# Patient Record
Sex: FEMALE | Race: BLACK OR AFRICAN AMERICAN | NOT HISPANIC OR LATINO | Employment: OTHER | URBAN - METROPOLITAN AREA
[De-identification: names, ages, dates, MRNs, and addresses within clinical notes are randomized per-mention and may not be internally consistent; named-entity substitution may affect disease eponyms.]

---

## 2023-01-01 ENCOUNTER — TELEPHONE (OUTPATIENT)
Dept: PRIMARY CARE | Facility: CLINIC | Age: 88
End: 2023-01-01

## 2023-01-01 ENCOUNTER — LAB (OUTPATIENT)
Dept: LAB | Facility: LAB | Age: 88
End: 2023-01-01
Payer: MEDICARE

## 2023-01-01 ENCOUNTER — OFFICE VISIT (OUTPATIENT)
Dept: PRIMARY CARE | Facility: CLINIC | Age: 88
End: 2023-01-01
Payer: MEDICARE

## 2023-01-01 VITALS
DIASTOLIC BLOOD PRESSURE: 71 MMHG | HEART RATE: 95 BPM | BODY MASS INDEX: 19.75 KG/M2 | WEIGHT: 108 LBS | SYSTOLIC BLOOD PRESSURE: 163 MMHG

## 2023-01-01 DIAGNOSIS — J18.9 PNEUMONIA OF RIGHT LOWER LOBE DUE TO INFECTIOUS ORGANISM: Primary | ICD-10-CM

## 2023-01-01 DIAGNOSIS — N30.01 ACUTE CYSTITIS WITH HEMATURIA: ICD-10-CM

## 2023-01-01 DIAGNOSIS — F03.90 DEMENTIA, SENILE (MULTI): Primary | ICD-10-CM

## 2023-01-01 DIAGNOSIS — R32 URINARY INCONTINENCE, UNSPECIFIED TYPE: ICD-10-CM

## 2023-01-01 DIAGNOSIS — R32 URINARY INCONTINENCE, UNSPECIFIED TYPE: Primary | ICD-10-CM

## 2023-01-01 DIAGNOSIS — E86.0 DEHYDRATION: ICD-10-CM

## 2023-01-01 DIAGNOSIS — R06.89 ABNORMAL BREATH SOUNDS: ICD-10-CM

## 2023-01-01 LAB
APPEARANCE, URINE: ABNORMAL
BACTERIA, URINE: ABNORMAL /HPF
BILIRUBIN, URINE: NEGATIVE
BLOOD, URINE: ABNORMAL
BUDDING YEAST, URINE: PRESENT /HPF
COLOR, URINE: ABNORMAL
GLUCOSE, URINE: NEGATIVE MG/DL
KETONES, URINE: NEGATIVE MG/DL
LEUKOCYTE ESTERASE, URINE: ABNORMAL
MUCUS, URINE: ABNORMAL /LPF
NITRITE, URINE: NEGATIVE
PH, URINE: 5 (ref 5–8)
PROTEIN, URINE: ABNORMAL MG/DL
RBC, URINE: 6 /HPF (ref 0–5)
SPECIFIC GRAVITY, URINE: 1.02 (ref 1–1.03)
SQUAMOUS EPITHELIAL CELLS, URINE: 2 /HPF
UROBILINOGEN, URINE: <2 MG/DL (ref 0–1.9)
WBC, URINE: 14 /HPF (ref 0–5)

## 2023-01-01 PROCEDURE — 1159F MED LIST DOCD IN RCRD: CPT | Performed by: INTERNAL MEDICINE

## 2023-01-01 PROCEDURE — 81001 URINALYSIS AUTO W/SCOPE: CPT

## 2023-01-01 PROCEDURE — 99214 OFFICE O/P EST MOD 30 MIN: CPT | Performed by: INTERNAL MEDICINE

## 2023-01-01 PROCEDURE — 1160F RVW MEDS BY RX/DR IN RCRD: CPT | Performed by: INTERNAL MEDICINE

## 2023-01-01 PROCEDURE — 1036F TOBACCO NON-USER: CPT | Performed by: INTERNAL MEDICINE

## 2023-01-01 RX ORDER — HYDROXYCHLOROQUINE SULFATE 200 MG/1
400 TABLET, FILM COATED ORAL
COMMUNITY
Start: 2015-01-09

## 2023-01-01 RX ORDER — FLUOCINONIDE 0.5 MG/G
CREAM TOPICAL
COMMUNITY
Start: 2018-05-02

## 2023-01-01 RX ORDER — CIPROFLOXACIN 250 MG/1
250 TABLET, FILM COATED ORAL 2 TIMES DAILY
Qty: 10 TABLET | Refills: 0 | Status: SHIPPED | OUTPATIENT
Start: 2023-01-01 | End: 2023-01-01

## 2023-01-01 RX ORDER — KETOCONAZOLE 20 MG/ML
SHAMPOO, SUSPENSION TOPICAL
COMMUNITY
Start: 2020-01-07

## 2023-01-01 RX ORDER — ACETAMINOPHEN 500 MG
TABLET ORAL DAILY
COMMUNITY
Start: 2017-10-20

## 2023-01-01 RX ORDER — LACTOSE-REDUCED FOOD
LIQUID (ML) ORAL
COMMUNITY

## 2023-01-01 RX ORDER — LEVOFLOXACIN 750 MG/1
750 TABLET ORAL DAILY
Qty: 5 TABLET | Refills: 0 | Status: SHIPPED | OUTPATIENT
Start: 2023-01-01 | End: 2023-01-01

## 2023-01-01 ASSESSMENT — ENCOUNTER SYMPTOMS
HEADACHES: 0
FEVER: 0
FREQUENCY: 1
SLEEP DISTURBANCE: 0
CHILLS: 0
PALPITATIONS: 0
DECREASED CONCENTRATION: 1
AGITATION: 0
HYPERACTIVE: 0
UNEXPECTED WEIGHT CHANGE: 0
COUGH: 0
LIGHT-HEADEDNESS: 0
DYSURIA: 0
CONFUSION: 1
ABDOMINAL PAIN: 0
SHORTNESS OF BREATH: 0
NAUSEA: 0
WEAKNESS: 1
DIZZINESS: 0
CHEST TIGHTNESS: 0
WHEEZING: 0
NERVOUS/ANXIOUS: 0

## 2023-03-24 PROBLEM — F03.90 DEMENTIA, SENILE (MULTI): Status: ACTIVE | Noted: 2023-01-01

## 2023-03-24 PROBLEM — R56.9 SEIZURE-LIKE ACTIVITY (MULTI): Status: ACTIVE | Noted: 2023-01-01

## 2023-03-24 PROBLEM — H90.3 ASYMMETRICAL SENSORINEURAL HEARING LOSS: Status: ACTIVE | Noted: 2023-01-01

## 2023-03-24 PROBLEM — E86.0 DEHYDRATION: Status: ACTIVE | Noted: 2023-01-01

## 2023-03-24 PROBLEM — M35.9 CONNECTIVE TISSUE DISEASE, UNDIFFERENTIATED (MULTI): Status: ACTIVE | Noted: 2023-01-01

## 2023-03-24 PROBLEM — E55.9 VITAMIN D DEFICIENCY: Status: ACTIVE | Noted: 2023-01-01

## 2023-03-24 PROBLEM — M85.80 OSTEOPENIA: Status: ACTIVE | Noted: 2023-01-01

## 2023-03-24 PROBLEM — R32 URINARY INCONTINENCE: Status: ACTIVE | Noted: 2023-01-01

## 2023-03-24 PROBLEM — R55 SYNCOPE: Status: ACTIVE | Noted: 2023-01-01

## 2023-03-24 PROBLEM — N18.31 CHRONIC RENAL IMPAIRMENT, STAGE 3A (MULTI): Status: ACTIVE | Noted: 2023-01-01

## 2023-03-29 NOTE — PATIENT INSTRUCTIONS
We will start with a urinalysis as well as a chest x-ray.  Agree with continuing to hydrate with Gatorade or Powerade.  If any acute change in status, please contact us promptly.

## 2023-03-29 NOTE — PROGRESS NOTES
"Мария Frias comes in today for confusion and incontinence.      Ms. Frias comes in today accompanied by 3 of her family members.  There is concern of urinary incontinence, something she has battled for several years with her advancing dementia, but family states that this seems to be slightly worse and with a stronger odor.  They also note increased confusion, again consistent with her dementia but seemingly worse over the past week.  She has not had any fevers.  She herself states that she feels perfectly well with no concerns, but again with a significant cognitive decline.    She was reportedly taken to emergency room about 1 month ago with lower extremity swelling.  This is more prominent in the left lower extremity but only after being up on her feet and seated all day.  This goes down through the night.  Reportedly no further evaluation was done and she was told that her symptoms are not concerning, unclear whether a Doppler was performed.  She denies any pain associated with this.  There has been no change in her breathing.  Family is quite philippe with her while in the office, she seems quite suppressed, not argumentative, and quite polite and quiet.  She has not had any further true syncopal spells, but the daughters state they give her straight pink sea salt whenever she starts to \"go out.\"        Review of Systems   Constitutional:  Negative for chills, fever and unexpected weight change.   Respiratory:  Negative for cough, chest tightness, shortness of breath and wheezing.    Cardiovascular:  Positive for leg swelling. Negative for chest pain and palpitations.   Gastrointestinal:  Negative for abdominal pain and nausea.   Genitourinary:  Positive for frequency. Negative for dysuria and urgency.   Neurological:  Positive for weakness. Negative for dizziness, syncope, light-headedness and headaches.   Psychiatric/Behavioral:  Positive for confusion and decreased concentration. Negative for agitation and " sleep disturbance. The patient is not nervous/anxious and is not hyperactive.        Objective   Physical Exam  Constitutional:       Appearance: Normal appearance.   Cardiovascular:      Rate and Rhythm: Normal rate and regular rhythm.   Pulmonary:      Effort: Pulmonary effort is normal. No respiratory distress.      Breath sounds: Examination of the right-upper field reveals rhonchi. Examination of the right-middle field reveals rhonchi. Examination of the right-lower field reveals rhonchi. Rhonchi present. No decreased breath sounds or wheezing.   Neurological:      Mental Status: She is alert.         Assessment/Plan   Urinary incontinence: Reasonable to check urinalysis.  Unclear whether she will be able to give specimen, she will need to use assistance with at least a hat.  Hopefully will not need catheterized specimen, would have to go through [facility] or ER for this.  Abnormal breath sounds: This is different for her.  Would like to proceed with chest x-ray for further evaluation.  Family voices agreement.  Senile dementia: Have spent lengthy time discussing this with family.  This is a progressive disease.  She follows with neurology and neuropsychology.  They need to reach out for assistance in caregiving if they feel overwhelmed.  Have explained that incontinence classically will worsen both bladder and bowel.  They voiced understanding.  Again, follow-up urine studies.  Dehydration: Encourage hydrating fluids.  They do mention that they are using Powerade and Gatorade to help with hydration.  Agree.    Contact us with any acute change in status.  We will follow-up on results once available.  Problem List Items Addressed This Visit          Nervous    Dementia, senile (CMS/HCC) - Primary       Genitourinary    Urinary incontinence    Relevant Orders    Urinalysis with Reflex Microscopic     Other Visit Diagnoses       Abnormal breath sounds        Relevant Orders    XR chest 2 views